# Patient Record
Sex: FEMALE | Race: NATIVE HAWAIIAN OR OTHER PACIFIC ISLANDER | NOT HISPANIC OR LATINO | Employment: FULL TIME | URBAN - METROPOLITAN AREA
[De-identification: names, ages, dates, MRNs, and addresses within clinical notes are randomized per-mention and may not be internally consistent; named-entity substitution may affect disease eponyms.]

---

## 2019-02-06 ENCOUNTER — HISTORICAL (OUTPATIENT)
Dept: ADMINISTRATIVE | Facility: HOSPITAL | Age: 29
End: 2019-02-06

## 2019-02-06 LAB
ABS NEUT (OLG): 4.9 X10(3)/MCL (ref 2.1–9.2)
ALBUMIN SERPL-MCNC: 4.4 GM/DL (ref 3.4–5)
ALBUMIN/GLOB SERPL: 1.83 {RATIO} (ref 1.5–2.5)
ALP SERPL-CCNC: 81 UNIT/L (ref 38–126)
ALT SERPL-CCNC: 12 UNIT/L (ref 7–52)
AST SERPL-CCNC: 14 UNIT/L (ref 15–37)
BILIRUB SERPL-MCNC: 0.5 MG/DL (ref 0.2–1)
BILIRUBIN DIRECT+TOT PNL SERPL-MCNC: 0.1 MG/DL (ref 0–0.5)
BILIRUBIN DIRECT+TOT PNL SERPL-MCNC: 0.4 MG/DL
BUN SERPL-MCNC: 11 MG/DL (ref 7–18)
CALCIUM SERPL-MCNC: 8 MG/DL (ref 8.5–10)
CHLORIDE SERPL-SCNC: 97 MMOL/L (ref 98–107)
CHOLEST SERPL-MCNC: 148 MG/DL (ref 0–200)
CHOLEST/HDLC SERPL: 2.7 {RATIO}
CO2 SERPL-SCNC: 30 MMOL/L (ref 21–32)
CREAT SERPL-MCNC: 0.54 MG/DL (ref 0.6–1.3)
ERYTHROCYTE [DISTWIDTH] IN BLOOD BY AUTOMATED COUNT: 13.4 % (ref 11.5–17)
GLOBULIN SER-MCNC: 2.4 GM/DL (ref 1.2–3)
GLUCOSE SERPL-MCNC: 89 MG/DL (ref 74–106)
HCT VFR BLD AUTO: 40.2 % (ref 37–47)
HDLC SERPL-MCNC: 54 MG/DL (ref 35–60)
HGB BLD-MCNC: 12 GM/DL (ref 12–16)
LDLC SERPL CALC-MCNC: 84 MG/DL (ref 0–129)
LYMPHOCYTES # BLD AUTO: 2 X10(3)/MCL (ref 0.6–3.4)
LYMPHOCYTES NFR BLD AUTO: 27.6 % (ref 13–40)
MCH RBC QN AUTO: 22.3 PG (ref 27–31.2)
MCHC RBC AUTO-ENTMCNC: 30 GM/DL (ref 32–36)
MCV RBC AUTO: 75 FL (ref 80–94)
MONOCYTES # BLD AUTO: 0.3 X10(3)/MCL (ref 0.1–1.3)
MONOCYTES NFR BLD AUTO: 3.9 % (ref 0.1–24)
NEUTROPHILS NFR BLD AUTO: 68.5 % (ref 47–80)
PLATELET # BLD AUTO: 235 X10(3)/MCL (ref 130–400)
PMV BLD AUTO: 8.4 FL (ref 9.4–12.4)
POTASSIUM SERPL-SCNC: 3.7 MMOL/L (ref 3.5–5.1)
PROT SERPL-MCNC: 6.8 GM/DL (ref 6.4–8.2)
RBC # BLD AUTO: 5.38 X10(6)/MCL (ref 4.2–5.4)
SODIUM SERPL-SCNC: 130 MMOL/L (ref 136–145)
TRIGL SERPL-MCNC: 42 MG/DL (ref 30–150)
TSH SERPL-ACNC: 1.14 MIU/ML (ref 0.35–4.94)
VLDLC SERPL CALC-MCNC: 8.4 MG/DL
WBC # SPEC AUTO: 7.2 X10(3)/MCL (ref 4.5–11.5)

## 2019-02-13 ENCOUNTER — HISTORICAL (OUTPATIENT)
Dept: ADMINISTRATIVE | Facility: HOSPITAL | Age: 29
End: 2019-02-13

## 2019-02-13 LAB
ALBUMIN SERPL-MCNC: 4.4 GM/DL (ref 3.4–5)
ALBUMIN/GLOB SERPL: 1.63 {RATIO} (ref 1.5–2.5)
ALP SERPL-CCNC: 94 UNIT/L (ref 38–126)
ALT SERPL-CCNC: 12 UNIT/L (ref 7–52)
AST SERPL-CCNC: 14 UNIT/L (ref 15–37)
BILIRUB SERPL-MCNC: 0.5 MG/DL (ref 0.2–1)
BILIRUBIN DIRECT+TOT PNL SERPL-MCNC: 0.1 MG/DL (ref 0–0.5)
BILIRUBIN DIRECT+TOT PNL SERPL-MCNC: 0.4 MG/DL
BUN SERPL-MCNC: 12 MG/DL (ref 7–18)
CALCIUM SERPL-MCNC: 8.6 MG/DL (ref 8.5–10)
CHLORIDE SERPL-SCNC: 103 MMOL/L (ref 98–107)
CO2 SERPL-SCNC: 31 MMOL/L (ref 21–32)
CREAT SERPL-MCNC: 0.52 MG/DL (ref 0.6–1.3)
GLOBULIN SER-MCNC: 2.7 GM/DL (ref 1.2–3)
GLUCOSE SERPL-MCNC: 88 MG/DL (ref 74–106)
POTASSIUM SERPL-SCNC: 4.1 MMOL/L (ref 3.5–5.1)
PROT SERPL-MCNC: 7.1 GM/DL (ref 6.4–8.2)
SODIUM SERPL-SCNC: 137 MMOL/L (ref 136–145)

## 2020-06-15 ENCOUNTER — HISTORICAL (OUTPATIENT)
Dept: ADMINISTRATIVE | Facility: HOSPITAL | Age: 30
End: 2020-06-15

## 2020-06-15 LAB
ABS NEUT (OLG): 5.3 X10(3)/MCL (ref 2.1–9.2)
ALBUMIN SERPL-MCNC: 4.4 GM/DL (ref 3.4–5)
ALBUMIN/GLOB SERPL: 1.69 {RATIO} (ref 1.5–2.5)
ALP SERPL-CCNC: 62 UNIT/L (ref 38–126)
ALT SERPL-CCNC: 8 UNIT/L (ref 7–52)
AST SERPL-CCNC: 14 UNIT/L (ref 15–37)
BILIRUB SERPL-MCNC: 0.4 MG/DL (ref 0.2–1)
BILIRUBIN DIRECT+TOT PNL SERPL-MCNC: 0.1 MG/DL (ref 0–0.5)
BILIRUBIN DIRECT+TOT PNL SERPL-MCNC: 0.3 MG/DL
BUN SERPL-MCNC: 11 MG/DL (ref 7–18)
CALCIUM SERPL-MCNC: 8.9 MG/DL (ref 8.5–10)
CHLORIDE SERPL-SCNC: 102 MMOL/L (ref 98–107)
CHOLEST SERPL-MCNC: 151 MG/DL (ref 0–200)
CHOLEST/HDLC SERPL: 3.4 {RATIO}
CO2 SERPL-SCNC: 27 MMOL/L (ref 21–32)
CREAT SERPL-MCNC: 0.66 MG/DL (ref 0.6–1.3)
ERYTHROCYTE [DISTWIDTH] IN BLOOD BY AUTOMATED COUNT: 16.2 % (ref 11.5–17)
EST. AVERAGE GLUCOSE BLD GHB EST-MCNC: 111 MG/DL
GLOBULIN SER-MCNC: 2.7 GM/DL (ref 1.2–3)
GLUCOSE SERPL-MCNC: 116 MG/DL (ref 74–106)
HBA1C MFR BLD: 5.5 % (ref 4.4–6.4)
HCT VFR BLD AUTO: 33 % (ref 37–47)
HDLC SERPL-MCNC: 44 MG/DL (ref 35–60)
HGB BLD-MCNC: 9.6 GM/DL (ref 12–16)
LDLC SERPL CALC-MCNC: 75 MG/DL (ref 0–129)
LYMPHOCYTES # BLD AUTO: 2.4 X10(3)/MCL (ref 0.6–3.4)
LYMPHOCYTES NFR BLD AUTO: 29.9 % (ref 13–40)
MCH RBC QN AUTO: 19.5 PG (ref 27–31.2)
MCHC RBC AUTO-ENTMCNC: 29 GM/DL (ref 32–36)
MCV RBC AUTO: 67 FL (ref 80–94)
MONOCYTES # BLD AUTO: 0.3 X10(3)/MCL (ref 0.1–1.3)
MONOCYTES NFR BLD AUTO: 3.5 % (ref 0.1–24)
NEUTROPHILS NFR BLD AUTO: 66.6 % (ref 47–80)
PLATELET # BLD AUTO: 322 X10(3)/MCL (ref 130–400)
PMV BLD AUTO: 8.9 FL (ref 9.4–12.4)
POTASSIUM SERPL-SCNC: 4 MMOL/L (ref 3.5–5.1)
PROT SERPL-MCNC: 7 GM/DL (ref 6.4–8.2)
RBC # BLD AUTO: 4.93 X10(6)/MCL (ref 4.2–5.4)
SODIUM SERPL-SCNC: 136 MMOL/L (ref 136–145)
TRIGL SERPL-MCNC: 147 MG/DL (ref 30–150)
TSH SERPL-ACNC: 1.73 MIU/ML (ref 0.35–4.94)
VLDLC SERPL CALC-MCNC: 29.4 MG/DL
WBC # SPEC AUTO: 8 X10(3)/MCL (ref 4.5–11.5)

## 2020-06-16 ENCOUNTER — HISTORICAL (OUTPATIENT)
Dept: ADMINISTRATIVE | Facility: HOSPITAL | Age: 30
End: 2020-06-16

## 2020-06-16 LAB
FERRITIN SERPL-MCNC: 4.2 NG/ML (ref 4.63–204)
IRON SATN MFR SERPL: 8 % (ref 20–50)
IRON SERPL-MCNC: 28 UG/DL (ref 50–170)
TIBC SERPL-MCNC: 342 UG/DL (ref 70–310)
TIBC SERPL-MCNC: 370 UG/DL (ref 250–450)
TRANSFERRIN SERPL-MCNC: 337 MG/DL (ref 180–382)

## 2020-09-08 ENCOUNTER — HISTORICAL (OUTPATIENT)
Dept: ADMINISTRATIVE | Facility: HOSPITAL | Age: 30
End: 2020-09-08

## 2020-09-08 LAB
ABS NEUT (OLG): 5.8 X10(3)/MCL (ref 2.1–9.2)
ERYTHROCYTE [DISTWIDTH] IN BLOOD BY AUTOMATED COUNT: 16.6 % (ref 11.5–17)
HCT VFR BLD AUTO: 34.7 % (ref 37–47)
HGB BLD-MCNC: 10.4 GM/DL (ref 12–16)
LYMPHOCYTES # BLD AUTO: 1.5 X10(3)/MCL (ref 0.6–3.4)
LYMPHOCYTES NFR BLD AUTO: 19.7 % (ref 13–40)
MCH RBC QN AUTO: 20.3 PG (ref 27–31.2)
MCHC RBC AUTO-ENTMCNC: 30 GM/DL (ref 32–36)
MCV RBC AUTO: 68 FL (ref 80–94)
MONOCYTES # BLD AUTO: 0.5 X10(3)/MCL (ref 0.1–1.3)
MONOCYTES NFR BLD AUTO: 6.9 % (ref 0.1–24)
NEUTROPHILS NFR BLD AUTO: 73.4 % (ref 47–80)
PLATELET # BLD AUTO: 298 X10(3)/MCL (ref 130–400)
PMV BLD AUTO: 8.4 FL (ref 9.4–12.4)
RBC # BLD AUTO: 5.12 X10(6)/MCL (ref 4.2–5.4)
WBC # SPEC AUTO: 7.8 X10(3)/MCL (ref 4.5–11.5)

## 2022-04-10 ENCOUNTER — HISTORICAL (OUTPATIENT)
Dept: ADMINISTRATIVE | Facility: HOSPITAL | Age: 32
End: 2022-04-10

## 2022-04-26 VITALS
SYSTOLIC BLOOD PRESSURE: 110 MMHG | WEIGHT: 161.38 LBS | DIASTOLIC BLOOD PRESSURE: 78 MMHG | BODY MASS INDEX: 27.55 KG/M2 | HEIGHT: 64 IN

## 2022-05-03 NOTE — HISTORICAL [facility] CERNER
This is a historical note converted from Certrisha. Formatting and pictures may have been removed.  Please reference aDrya for original formatting and attached multimedia. Chief Complaint  cpx fasting  History of Present Illness  29?year old female presents for wellness visit.  Blood Pressure - 104/76  BMI - 27.43  Immunizations -?Tdap 2018, Declines flu vaccine  Breast Cancer Screening - up to date, Sees Dr. Benavides  Cervical Cancer Screening -?up to date, Sees Dr. Benavides  Diet - Average  Exercise - Intermittent  She had her second child?1 year ago. ?She was diagnosed with gestational diabetes during pregnancy.? Her mother has a history of diabetes mellitus type 2.  Nasal congestion, rhinorrhea and postnasal drip?over the last week. ?Denies any fever or chills.? Some improvement with Allegra.  Review of Systems  Constitutional:?No weight loss, no fever, no fatigue, no chills, no night sweats,?no weakness  Eyes:?No blurred vision,?no redness,?no drainage,?no ocular?pain  HEENT:?No sore throat,?no ear pain, no sinus pressure, nasal congestion, rhinorrhea, postnasal drip  Respiratory:?No cough, no wheezing, no sputum production, no shortness of breath  Cardiovascular:?No chest pain, no palpitations, no dyspnea on exertion,?no orthopnea  Gastrointestinal:?No nausea, no vomiting, no abdominal pain, no diarrhea,?no constipation, no melena,?no hematochezia  Genitourinary:?No dysuria, no hematuria, no frequency, no urgency, no incontinence, no discharge  Musculoskeletal:?No myalgias, no arthralgias, no weakness, no joint effusion, no edema  Integumentary:?No rashes, no hives, no itching, no lesions, no jaundice  Neurologic:?No headaches, no numbness, no tingling, no weakness, no dizziness  Psychiatric:?No anxiety, no irritability, no depression,?no suicidal ideations, no?homicidal ideations,?no delusions, no hallucinations  Endocrine:?No polyuria, no polydipsia, no polyphagia  Hematology:?No bruising, no  lymphadenopathy,?no paleness  ?  Physical Exam  Vitals & Measurements  HR:?60(Peripheral)? BP:?104/76?  HT:?162?cm? WT:?72?kg? BMI:?27.43?  General:?Well developed, Well-nourished, in No acute distress, A&O x 4  Eye:?PERRLA, EOMI, Clear conjunctiva, Eyelids unremarkable  Ears:?Bilateral EAC clear?and?Bilateral TM clear  Nose:? Mucosa boggy, rhinorrhea, No lesions  O/P:??No?erythema,?No tonsillar hypertrophy,?No tonsillar exudates,?cobblestoning  Neck:?Soft, Nontender, No thyromegaly, Full range of motion, No cervical lymphadenopathy, No JVD  Cardiovascular:?Regular rate and rhythm, No murmurs, No gallops, No rubs  Respiratory:?Clear to auscultation bilaterally, No wheezes, No rhonchi, No?crackles  Abdomen:? Soft, Nontender, No hepatosplenomegaly, Normal and equal bowel sounds, No masses, No rebound, No guarding  Musculoskeletal:? No tenderness, FROM, No joint abnormality, No clubbing, No cyanosis,No?edema  Neurologic:? No motor or sensory deficits, Reflexes 2+ throughout, CN II-XII grossly intact  Integumentary:?No rashes or skin lesions  ?  Assessment/Plan  1.?Wellness examination?Z00.00  ?Discussed importance of maintaining a balanced diet and regular  Ordered:  CBC w/ Auto Diff, Routine collect, 02/06/19 10:01:00 CST, Blood, Stop date 02/06/19 10:01:00 CST, Lab Collect, Wellness examination, 02/06/19 10:01:00 CST  Comprehensive Metabolic Panel, Routine collect, 02/06/19 10:01:00 CST, Blood, Stop date 02/06/19 10:01:00 CST, Lab Collect, Wellness examination, 02/06/19 10:01:00 CST  Lipid Panel, Routine collect, 02/06/19 10:01:00 CST, Blood, Stop date 02/06/19 10:01:00 CST, Lab Collect, Wellness examination, 02/06/19 10:01:00 CST  Preventative Health Care Est 18-39 years [id number] PC, Wellness examination, [facility], 02/06/19 9:51:00 CST  Thyroid Stimulating Hormone, Routine collect, 02/06/19 10:01:00 CST, Blood, Stop date 02/06/19 10:01:00 CST, Lab Collect, Wellness examination, 02/06/19 10:01:00  CST  ?  2.?Allergic rhinitis?J30.9  ?Continue symptomatic treatment  ?   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  Health Maintenance  Health Maintenance  ???Pending?(in the next year)  ??? ??Due?  ??? ? ? ?ADL Screening due??19??and every 1??year(s)  ??? ? ? ?Alcohol Misuse Screening due??19??and every 1??year(s)  ??? ? ? ?Tetanus Vaccine due??19??and every 10??year(s)  ???Satisfied?(in the past 1 year)  ??? ??Satisfied?  ??? ? ? ?Blood Pressure Screening on??19.??Satisfied by Lissett Escudero LPN  ??? ? ? ?Body Mass Index Check on??19.??Satisfied by Lissett Escudero LPN  ??? ? ? ?Influenza Vaccine on??19.??Satisfied by Lissett Escudero LPN  ??? ? ? ?Obesity Screening on??19.??Satisfied by Lissett Escudero LPN  ?  ?

## 2022-05-03 NOTE — HISTORICAL [facility] CERNER
This is a historical note converted from Darya. Formatting and pictures may have been removed.  Please reference Darya for original formatting and attached multimedia. Chief Complaint  CPX NOT FASTING  History of Present Illness  30?year old female presents for wellness visit.  Blood Pressure - 120/72  BMI - 28.01  Immunizations -up-to-date at this time  Breast Cancer Screening - up to date, Sees Dr. Benavides  Cervical Cancer Screening -?up to date, Sees Dr. Benavides  Diet - Average  Exercise - Intermittent  Hx of ?gestational diabetes during 2nd?pregnancy.?  Patients 2-year-old son?accidentally hit her middle?lower lip?which she developed a?bruise.? She has had a persistent?soft tissue nodule in that area since.? Bothersome?to patient when she is speaking.? Nontender.  Review of Systems  Constitutional:?No weight loss, no fever, no fatigue, no chills, no night sweats,?no weakness  Eyes:?No blurred vision,?no redness,?no drainage,?no ocular?pain  HEENT:?No sore throat,?no ear pain, no sinus pressure, no nasal congestion, no rhinorrhea, no postnasal drip  Respiratory:?No cough, no wheezing, no sputum production, no shortness of breath  Cardiovascular:?No chest pain, no palpitations, no dyspnea on exertion,?no orthopnea  Gastrointestinal:?No nausea, no vomiting, no abdominal pain, no diarrhea,?no constipation, no melena,?no hematochezia  Genitourinary:?No dysuria, no hematuria, no frequency, no urgency, no incontinence, no discharge  Musculoskeletal:?No myalgias, no arthralgias, no weakness, no joint effusion, no edema  Integumentary:?No rashes, no hives, no itching,? lesion, no jaundice  Neurologic:?No headaches, no numbness, no tingling, no weakness, no dizziness  Psychiatric:?No anxiety, no irritability, no depression,?no suicidal ideations, no?homicidal ideations,?no delusions, no hallucinations  Endocrine:?No polyuria, no polydipsia, no polyphagia  Hematology:?No bruising, no lymphadenopathy,?no  paleness  ?  Physical Exam  Vitals & Measurements  HR:?90(Peripheral)? BP:?120/72?  HT:?162?cm? WT:?73.5?kg? BMI:?28.01?  General:?Well developed, Well-nourished, in No acute distress, A&O x 4  Eye:?PERRLA, EOMI, Clear conjunctiva, Eyelids unremarkable  Ears:?Bilateral EAC clear?and?Bilateral TM clear  Nose:? Mucosa normal, No rhinorrhea, No lesions  O/P:??No?erythema,?No tonsillar hypertrophy,?No tonsillar exudates,?No cobblestoning, soft tissue nodule left lower?lip  Neck:?Soft, Nontender, No thyromegaly, Full range of motion, No cervical lymphadenopathy, No JVD  Cardiovascular:?Regular rate and rhythm, No murmurs, No gallops, No rubs  Respiratory:?Clear to auscultation bilaterally, No wheezes, No rhonchi, No?crackles  Abdomen:? Soft, Nontender, No hepatosplenomegaly, Normal and equal bowel sounds, No masses, No rebound, No guarding  Musculoskeletal:? No tenderness, FROM, No joint abnormality, No clubbing, No cyanosis,No?edema  Neurologic:? No motor or sensory deficits, Reflexes 2+ throughout, CN II-XII grossly intact  Integumentary:?No rashes or skin lesions  ?  Assessment/Plan  1.?Wellness examination?Z00.00  ?Discussed the?importance of maintaining a balanced diet and regular exercise  Ordered:  CBC w/ Auto Diff, Routine collect, 06/15/20 13:44:00 CDT, Blood, Stop date 06/15/20 13:44:00 CDT, Lab Collect, Wellness examination, 06/15/20 13:44:00 CDT  Comprehensive Metabolic Panel, Routine collect, 06/15/20 13:44:00 CDT, Blood, Stop date 06/15/20 13:44:00 CDT, Lab Collect, Wellness examination, 06/15/20 13:44:00 CDT  Lab Collection Request, 06/15/20 13:31:00 CDT, [facility], 06/15/20 13:31:00 CDT, Wellness examination  Lipid Panel, Routine collect, 06/15/20 13:44:00 CDT, Blood, Stop date 06/15/20 13:44:00 CDT, Lab Collect, Wellness examination, 06/15/20 13:44:00 CDT  Preventative Health Care Est 18-39 years [id number] PC, Wellness examination, [facility] - [facility], 06/15/20 13:31:00 CDT  Thyroid Stimulating Hormone,  Routine collect, 06/15/20 13:44:00 CDT, Blood, Stop date 06/15/20 13:44:00 CDT, Lab Collect, Wellness examination, 06/15/20 13:44:00 CDT  ?  2.?Gestational diabetes?O24.419  Ordered:  Hemoglobin A1c, Routine collect, 06/15/20 13:44:00 CDT, Blood, Stop date 06/15/20 13:44:00 CDT, Lab Collect, Gestational diabetes, 06/15/20 13:44:00 CDT  ?  3.?Oral mucosal lesion?K13.70  Ordered:  External Referral, Oral mucosal lesion, Oral surgery, Dr. Delcid, 06/15/20 13:43:00 CDT, Oral mucosal lesion  ?  Referrals  External Referral, Oral mucosal lesion, Oral surgery, Dr. Delcid, 06/15/20 13:43:00 CDT, Oral mucosal lesion   Problem List/Past Medical History  Ongoing  Gestational diabetes  Historical  No qualifying data  Procedure/Surgical History   section ()   section ()   Medications  No active medications  Allergies  No Known Medication Allergies  Social History  Abuse/Neglect  No, 06/15/2020  Alcohol  Current, Wine, 1-2 times per year, Alcohol use interferes with work or home: No. Others hurt by drinking: No. 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  Primary malignant neoplasm of breast: Mother (Dx at 51).  Father: History is negative  Brother: History is negative  Daughter: History is negative  Son: History is negative  Immunizations  Vaccine Date Status   tetanus/diphtheria/pertussis, acel(Tdap) 2018 Recorded   Health Maintenance  Health Maintenance  ???Pending?(in the next year)  ??? ??OverDue  ??? ? ? ?Diabetes Screening due??and every?  ??? ? ? ?Cervical Cancer Screening due??16??and every 3??year(s)  ??? ? ? ?Alcohol Misuse Screening due??20??and every 1??year(s)  ??? ? ? ?Diabetes Maintenance-Serum Creatinine due??20??and every  1??year(s)  ??? ??Due?  ??? ? ? ?ADL Screening due??06/15/20??and every 1??year(s)  ??? ? ? ?Depression Screening due??06/15/20??and every?  ??? ? ? ?Diabetes Maintenance-Medication Prescribed due??06/15/20??and every 1??year(s)  ??? ? ? ?Diabetes Maintenance-Microalbumin due??06/15/20??Variable frequency  ??? ? ? ?Diabetes Maintenance-Urine Dipstick due??06/15/20??Variable frequency  ??? ??Due In Future?  ??? ? ? ?Obesity Screening not due until??01/01/21??and every 1??year(s)  ???Satisfied?(in the past 1 year)  ??? ??Satisfied?  ??? ? ? ?Blood Pressure Screening on??06/15/20.??Satisfied by Lissett Escudero LPN  ??? ? ? ?Body Mass Index Check on??06/15/20.??Satisfied by Lissett Escudero LPN  ??? ? ? ?Obesity Screening on??06/15/20.??Satisfied by Lissett Escudero LPN  ?

## 2022-05-03 NOTE — HISTORICAL [facility] CERNER
This is a historical note converted from Darya. Formatting and pictures may have been removed.  Please reference Darya for original formatting and attached multimedia. Chief Complaint  3 MTH F/U  History of Present Illness  Patient presents for follow-up for iron deficiency anemia.? Hemoglobin?9.6 and hematocrit 33 in June. ?Patient?taking ferrous sulfate twice weekly?due to?constipation if taken more consistently.? Denies any new complaints or concerns.  Review of Systems  Constitutional:?No weight loss, no fever, no fatigue, no chills, no night sweats,?no weakness  Eyes:?No blurred vision,?no redness,?no drainage,?no ocular?pain  HEENT:?No sore throat,?no ear pain, no sinus pressure, no nasal congestion, no rhinorrhea, no postnasal drip  Respiratory:?No cough, no wheezing, no sputum production, no shortness of breath  Cardiovascular:?No chest pain, no palpitations, no dyspnea on exertion,?no orthopnea  Gastrointestinal:?No nausea, no vomiting, no abdominal pain, no diarrhea,?no constipation, no melena,?no hematochezia  Genitourinary:?No dysuria, no hematuria, no frequency, no urgency, no incontinence, no discharge  Musculoskeletal:?No myalgias, no arthralgias, no weakness, no joint effusion, no edema  Integumentary:?No rashes, no hives, no itching, no lesions, no jaundice  Neurologic:?No headaches, no numbness, no tingling, no weakness, no dizziness  Psychiatric:?No anxiety, no irritability, no depression,?no suicidal ideations, no?homicidal ideations,?no delusions, no hallucinations  Endocrine:?No polyuria, no polydipsia, no polyphagia  Hematology:?No bruising, no lymphadenopathy,?no paleness  ?  Physical Exam  Vitals & Measurements  HR:?70(Peripheral)? BP:?110/78?  HT:?162.00?cm? WT:?73.200?kg? BMI:?27.89?  General:?Well developed, Well-nourished, in No acute distress, A&O x 4  Cardiovascular:?Regular rate and rhythm, No murmurs, No gallops, No rubs  Respiratory:?Clear to auscultation bilaterally, No wheezes,  No rhonchi, No?crackles  Abdomen:? Soft, Nontender, No hepatosplenomegaly, Normal and equal bowel sounds, No masses, No rebound, No guarding  Musculoskeletal:? No tenderness, FROM, No joint abnormality, No clubbing, No cyanosis,No?edema  Integumentary:?No rashes or skin lesions  Assessment/Plan  1.?Iron deficiency anemia?D50.9  Ordered:  Office/Outpatient Visit Level 3 Established [id number] PC, Iron deficiency anemia, [facility], 20 9:14:00 CDT  ?   Problem List/Past Medical History  Ongoing  Gestational diabetes  Historical  No qualifying data  Procedure/Surgical History   section (2018)   section ()   Medications  ferrous sulfate 325 mg (65 mg elemental iron) oral delayed release tablet, 325 mg= 1 tab(s), Oral, Daily  Allergies  No Known Medication Allergies  Social History  Abuse/Neglect  No, 2020  Alcohol  Current, Wine, 1-2 times per year, Alcohol use interferes with work or home: No. Others hurt by drinking: No. 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  Primary malignant neoplasm of breast: Mother (Dx at 51).  Father: History is negative  Brother: History is negative  Daughter: History is negative  Son: History is negative  Immunizations  Vaccine Date Status   tetanus/diphtheria/pertussis, acel(Tdap) 2018 Recorded   Health Maintenance  Health Maintenance  ???Pending?(in the next year)  ??? ??OverDue  ??? ? ? ?Cervical Cancer Screening due??16??and every 3??year(s)  ??? ? ? ?Alcohol Misuse Screening due??20??and every 1??year(s)  ??? ??Due?  ??? ? ? ?ADL Screening due??20??and every 1??year(s)  ??? ? ? ?Depression Screening due??20??and every?  ??? ? ? ?Diabetes Maintenance-Medication Prescribed  due??09/08/20??and every 1??year(s)  ??? ? ? ?Influenza Vaccine due??09/08/20??and every?  ??? ??Due In Future?  ??? ? ? ?Obesity Screening not due until??01/01/21??and every 1??year(s)  ??? ? ? ?Diabetes Maintenance-Fasting Lipid Profile not due until??06/15/21??and every 1??year(s)  ??? ? ? ?Diabetes Maintenance-Serum Creatinine not due until??06/15/21??and every 1??year(s)  ???Satisfied?(in the past 1 year)  ??? ??Satisfied?  ??? ? ? ?Blood Pressure Screening on??09/08/20.??Satisfied by Lissett Escudero LPN  ??? ? ? ?Body Mass Index Check on??09/08/20.??Satisfied by Lissett Escudero LPN  ??? ? ? ?Diabetes Maintenance-Fasting Lipid Profile on??06/15/20.??Satisfied by Chepe Mckeon  ??? ? ? ?Diabetes Maintenance-HgbA1c on??06/15/20.??Satisfied by Chepe Mckeon  ??? ? ? ?Diabetes Maintenance-Serum Creatinine on??06/15/20.??Satisfied by Chepe Mckeon  ??? ? ? ?Diabetes Screening on??06/15/20.??Satisfied by Chepe Mckeon  ??? ? ? ?Obesity Screening on??09/08/20.??Satisfied by Lissett Escudero LPN  ?  Lab Results  Test Name Test Result Date/Time   Iron Lvl 28 ug/dL (Low) 06/16/2020 13:47 CDT   Transferrin 337 mg/dL 06/16/2020 13:47 CDT   TIBC 370 ug/dL 06/16/2020 13:47 CDT   Iron Sat 8 % (Low) 06/16/2020 13:47 CDT   Ferritin Lvl 4.20 ng/mL (Low) 06/16/2020 13:47 CDT   UIBC 342 ug/dL (High) 06/16/2020 13:47 CDT   WBC 8.0 x10(3)/mcL 06/15/2020 13:44 CDT   RBC 4.93 x10(6)/mcL 06/15/2020 13:44 CDT   Hgb 9.6 gm/dL (Low) 06/15/2020 13:44 CDT   Hct 33.0 % (Low) 06/15/2020 13:44 CDT   Platelet 322 x10(3)/mcL 06/15/2020 13:44 CDT   MCV 67 fL (Low) 06/15/2020 13:44 CDT   MCH 19.5 pg (Low) 06/15/2020 13:44 CDT   MCHC 29 gm/dL (Low) 06/15/2020 13:44 CDT   RDW 16.2 % 06/15/2020 13:44 CDT   MPV 8.9 fL (Low) 06/15/2020 13:44 CDT   Abs Neut 5.3 x10(3)/mcL 06/15/2020 13:44 CDT   Neutro Auto 66.6 % 06/15/2020 13:44 CDT   Lymph Auto 29.9 % 06/15/2020 13:44 CDT

## 2022-05-16 PROBLEM — D50.9 IRON DEFICIENCY ANEMIA: Status: ACTIVE | Noted: 2022-05-16

## 2022-05-16 PROCEDURE — 82728 ASSAY OF FERRITIN: CPT | Performed by: FAMILY MEDICINE

## 2022-05-16 PROCEDURE — 83540 ASSAY OF IRON: CPT | Performed by: FAMILY MEDICINE

## 2024-03-11 PROBLEM — Z00.00 WELLNESS EXAMINATION: Status: ACTIVE | Noted: 2024-03-11

## 2024-03-13 PROCEDURE — 87591 N.GONORRHOEAE DNA AMP PROB: CPT | Performed by: NURSE PRACTITIONER

## 2024-03-13 PROCEDURE — 83540 ASSAY OF IRON: CPT | Performed by: NURSE PRACTITIONER

## 2024-03-13 PROCEDURE — 86780 TREPONEMA PALLIDUM: CPT | Performed by: NURSE PRACTITIONER

## 2024-03-13 PROCEDURE — 87389 HIV-1 AG W/HIV-1&-2 AB AG IA: CPT | Performed by: NURSE PRACTITIONER

## 2024-06-17 PROBLEM — Z00.00 WELLNESS EXAMINATION: Status: RESOLVED | Noted: 2024-03-11 | Resolved: 2024-06-17

## 2025-03-13 PROBLEM — R73.03 PREDIABETES: Status: ACTIVE | Noted: 2025-03-13

## 2025-03-17 PROCEDURE — 83540 ASSAY OF IRON: CPT | Performed by: NURSE PRACTITIONER
